# Patient Record
Sex: MALE | Race: BLACK OR AFRICAN AMERICAN
[De-identification: names, ages, dates, MRNs, and addresses within clinical notes are randomized per-mention and may not be internally consistent; named-entity substitution may affect disease eponyms.]

---

## 2018-10-04 ENCOUNTER — HOSPITAL ENCOUNTER (EMERGENCY)
Dept: HOSPITAL 1 - ED | Age: 48
Discharge: HOME | End: 2018-10-04
Payer: COMMERCIAL

## 2018-10-04 VITALS — DIASTOLIC BLOOD PRESSURE: 82 MMHG | SYSTOLIC BLOOD PRESSURE: 137 MMHG

## 2018-10-04 VITALS — BODY MASS INDEX: 27 KG/M2 | HEIGHT: 73 IN
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
## 2018-10-04 DIAGNOSIS — S61.412A: Primary | ICD-10-CM

## 2018-10-06 ENCOUNTER — HOSPITAL ENCOUNTER (EMERGENCY)
Dept: HOSPITAL 1 - ED | Age: 48
Discharge: HOME | End: 2018-10-06
Payer: COMMERCIAL

## 2018-10-06 VITALS — HEIGHT: 73 IN | BODY MASS INDEX: 26.7 KG/M2

## 2018-10-06 VITALS — DIASTOLIC BLOOD PRESSURE: 71 MMHG | SYSTOLIC BLOOD PRESSURE: 123 MMHG
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
## 2018-10-06 DIAGNOSIS — S61.412D: Primary | ICD-10-CM

## 2018-10-15 ENCOUNTER — HOSPITAL ENCOUNTER (EMERGENCY)
Dept: HOSPITAL 1 - ED | Age: 48
Discharge: HOME | End: 2018-10-15
Payer: COMMERCIAL

## 2018-10-15 VITALS — BODY MASS INDEX: 27 KG/M2 | HEIGHT: 73 IN

## 2018-10-15 VITALS — SYSTOLIC BLOOD PRESSURE: 128 MMHG | DIASTOLIC BLOOD PRESSURE: 75 MMHG
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
## 2018-10-15 DIAGNOSIS — S61.412D: Primary | ICD-10-CM

## 2019-07-14 ENCOUNTER — HOSPITAL ENCOUNTER (EMERGENCY)
Dept: HOSPITAL 26 - MED | Age: 49
Discharge: HOME | End: 2019-07-14
Payer: COMMERCIAL

## 2019-07-14 VITALS — WEIGHT: 215 LBS | HEIGHT: 73 IN | BODY MASS INDEX: 28.49 KG/M2

## 2019-07-14 VITALS — DIASTOLIC BLOOD PRESSURE: 86 MMHG | SYSTOLIC BLOOD PRESSURE: 128 MMHG

## 2019-07-14 VITALS — SYSTOLIC BLOOD PRESSURE: 133 MMHG | DIASTOLIC BLOOD PRESSURE: 90 MMHG

## 2019-07-14 DIAGNOSIS — M25.512: ICD-10-CM

## 2019-07-14 DIAGNOSIS — M62.838: ICD-10-CM

## 2019-07-14 DIAGNOSIS — M54.2: Primary | ICD-10-CM

## 2019-07-14 LAB
ALBUMIN FLD-MCNC: 3.1 G/DL (ref 3.4–5)
ANION GAP SERPL CALCULATED.3IONS-SCNC: 12.3 MMOL/L (ref 8–16)
AST SERPL-CCNC: 19 U/L (ref 15–37)
BASOPHILS # BLD AUTO: 0 K/UL (ref 0–0.22)
BASOPHILS NFR BLD AUTO: 0.4 % (ref 0–2)
BILIRUB SERPL-MCNC: 0.4 MG/DL (ref 0–1)
BUN SERPL-MCNC: 12 MG/DL (ref 7–18)
CHLORIDE SERPL-SCNC: 105 MMOL/L (ref 98–107)
CO2 SERPL-SCNC: 27.8 MMOL/L (ref 21–32)
CREAT SERPL-MCNC: 1 MG/DL (ref 0.7–1.3)
EOSINOPHIL # BLD AUTO: 0.2 K/UL (ref 0–0.4)
EOSINOPHIL NFR BLD AUTO: 5.4 % (ref 0–4)
ERYTHROCYTE [DISTWIDTH] IN BLOOD BY AUTOMATED COUNT: 12.5 % (ref 11.6–13.7)
GFR SERPL CREATININE-BSD FRML MDRD: 102 ML/MIN (ref 90–?)
GLUCOSE SERPL-MCNC: 103 MG/DL (ref 74–106)
HCT VFR BLD AUTO: 40.7 % (ref 36–52)
HGB BLD-MCNC: 13.4 G/DL (ref 12–18)
LYMPHOCYTES # BLD AUTO: 1.3 K/UL (ref 2–11.5)
LYMPHOCYTES NFR BLD AUTO: 36.9 % (ref 20.5–51.1)
MCH RBC QN AUTO: 29 PG (ref 27–31)
MCHC RBC AUTO-ENTMCNC: 33 G/DL (ref 33–37)
MCV RBC AUTO: 88.4 FL (ref 80–94)
MONOCYTES # BLD AUTO: 0.4 K/UL (ref 0.8–1)
MONOCYTES NFR BLD AUTO: 10.7 % (ref 1.7–9.3)
NEUTROPHILS # BLD AUTO: 1.6 K/UL (ref 1.8–7.7)
NEUTROPHILS NFR BLD AUTO: 46.6 % (ref 42.2–75.2)
PLATELET # BLD AUTO: 182 K/UL (ref 140–450)
POTASSIUM SERPL-SCNC: 4.1 MMOL/L (ref 3.5–5.1)
RBC # BLD AUTO: 4.61 MIL/UL (ref 4.2–6.1)
SODIUM SERPL-SCNC: 141 MMOL/L (ref 136–145)
WBC # BLD AUTO: 3.5 K/UL (ref 4.8–10.8)

## 2019-07-14 PROCEDURE — 85025 COMPLETE CBC W/AUTO DIFF WBC: CPT

## 2019-07-14 PROCEDURE — 99284 EMERGENCY DEPT VISIT MOD MDM: CPT

## 2019-07-14 PROCEDURE — 36415 COLL VENOUS BLD VENIPUNCTURE: CPT

## 2019-07-14 PROCEDURE — 84484 ASSAY OF TROPONIN QUANT: CPT

## 2019-07-14 PROCEDURE — 93005 ELECTROCARDIOGRAM TRACING: CPT

## 2019-07-14 PROCEDURE — 80053 COMPREHEN METABOLIC PANEL: CPT

## 2019-07-14 PROCEDURE — 96372 THER/PROPH/DIAG INJ SC/IM: CPT

## 2019-07-14 NOTE — NUR
DISCHARGE PAPERS GIVEN TO PT. DR ZHU DC'D PT AND PROVIED INSTRUCTOIN, WHEN 
TO F/U WITH PCP AND WHEN TO RETURN TO ER. PT STATES 2/10 PAIN AND RELIEF. PT 
VERBALIZED UNDERSTANDING OF DC INSTRUCTIONS. ALL QUESTIONS ANSWERED.

## 2019-07-14 NOTE — NUR
50 Y/O MALE PRESENTS TO ED WITH COMPLAINTS OF LEFT SIDED NECK AND SHOULDER PAIN 
THAT RADIATES TO THE LOWER LEFT THORASIC.  NO MEDICAL HX. IBUPROFEN TAKEN 
ONETIME YESTERDAY W/ NO RELIEF.  SPINE IS ALIGNED. NO DEFORMETIES. FULL ROM CMS 
INTACT OF BILAT UPPER AND LOWER EXTREMETIES. C/O PAIN 8/10. POSITION IN BED FOR 
COMFORT. MD MADE AWARE. CONTINUE TO MONITOR.